# Patient Record
(demographics unavailable — no encounter records)

---

## 2025-04-15 NOTE — HISTORY OF PRESENT ILLNESS
[de-identified] : at today's visit [FreeTextEntry1] : last seen Feb 2025 for the past few weeks has been getting pain and stiffness of the knuckles, no swelling  meloxicam helps no other symptoms

## 2025-04-15 NOTE — ASSESSMENT
[FreeTextEntry1] : ------------------------------------------------------------  #Seropositive, nonerosive RA -Previously diagnosed with rheumatoid arthritis around 2 years ago, transiently on hydroxychloroquine, lost to follow-up -CCP 95, RF 79 ESR 41, CRP normal -US with trace fluid left wrist, no inflammatory signal Imaging June 2023 X-rays hands/wrists June 2023: no erosions *old distal left tibial fracture deformity , she had a fracture at age 12 X-rays of ankle/feet June 2023: no erosions  *Lost to follow up, stopped HCQ then resumed recently ([id number]) * Episode of pain/swelling at right foot as above High-Grade stress reaction and bursitis on MRI Seeing podiatrist , resolved now   Monitoring labs obtained 2/10/25 reviewed with patient today: normal limits, CRP elevated at 20 and ESR 39 Currently with pain/stiffness of hands/fingers, discussed today stepping up therapy with MTX or leflunomide risks/benefits/alternatives/side effects reviewed with patient at length, she will think about it  Continue with  mg daily, to let me know if she looses more weight to reduce the dose to 200 mg daily most recent eye exam report from April 2024, cleared to continue- needs a repeat exam  obtain labs today  obtain X-rays of hands/feet, obtain US of hands to assess for synovitis    # Chest pain, transient episodes undergoing work up with cardiology, so far negative TTE no pericardial effusion  CXR WNL    #Vitamin D insufficiency, resolved continue supplementation  #Hypothyroidism, currently not on treatment advised to follow up with endo  #APOLONIA positive, rheumatologic ROS otherwise unrevealing SSA/SSB, Sm, RNP negative TPO + DsDNA 52, normal C' negative aPLs complement and dsDNA, urine studies- WNL  RTO in 4 weeks

## 2025-04-15 NOTE — DATA REVIEWED
[FreeTextEntry1] : Labs and chart notes reviewed today with patient increase in inflammatory markers  Imaging completed reviewed with patient today TTE no pericardial effusion  CXR WNL

## 2025-04-15 NOTE — HISTORY OF PRESENT ILLNESS
[de-identified] : at today's visit [FreeTextEntry1] : last seen Feb 2025 for the past few weeks has been getting pain and stiffness of the knuckles, no swelling  meloxicam helps no other symptoms

## 2025-04-15 NOTE — ASSESSMENT
[FreeTextEntry1] : ------------------------------------------------------------  #Seropositive, nonerosive RA -Previously diagnosed with rheumatoid arthritis around 2 years ago, transiently on hydroxychloroquine, lost to follow-up -CCP 95, RF 79 ESR 41, CRP normal -US with trace fluid left wrist, no inflammatory signal Imaging June 2023 X-rays hands/wrists June 2023: no erosions *old distal left tibial fracture deformity , she had a fracture at age 12 X-rays of ankle/feet June 2023: no erosions  *Lost to follow up, stopped HCQ then resumed recently ([id number]) * Episode of pain/swelling at right foot as above High-Grade stress reaction and bursitis on MRI Seeing podiatrist , resolved now   Monitoring labs obtained 2/10/25 reviewed with patient today: normal limits, CRP elevated at 20 and ESR 39 Currently with pain/stiffness of hands/fingers, discussed today stepping up therapy with MTX or leflunomide risks/benefits/alternatives/side effects reviewed with patient at length, she will think about it  Continue with  mg daily, to let me know if she looses more weight to reduce the dose to 200 mg daily most recent eye exam report from April 2024, cleared to continue- needs a repeat exam  obtain labs today  obtain X-rays of hands/feet, obtain US of hands to assess for synovitis    # Chest pain, transient episodes undergoing work up with cardiology, so far negative TTE no pericardial effusion  CXR WNL    #Vitamin D insufficiency, resolved continue supplementation  #Hypothyroidism, currently not on treatment advised to follow up with endo  #APOLONIA positive, rheumatologic ROS otherwise unrevealing SSA/SSB, Sm, RNP negative TPO + DsDNA 52, normal C' negative aPLs complement and dsDNA, urine studies- WNL  RTO in 4 weeks

## 2025-04-15 NOTE — PHYSICAL EXAM
[General Appearance - Alert] : alert [General Appearance - In No Acute Distress] : in no acute distress [Respiration, Rhythm And Depth] : normal respiratory rhythm and effort [FreeTextEntry1] : no synovitis, tenderness to PIPs  [] : no rash [Impaired Insight] : insight and judgment were intact

## 2025-05-05 NOTE — ASSESSMENT
[FreeTextEntry1] : ------------------------------------------------------------  #Seropositive, nonerosive RA -Previously diagnosed with rheumatoid arthritis around 2 years ago, transiently on hydroxychloroquine, lost to follow-up -CCP 95, RF 79 ESR 41, CRP normal -US with trace fluid left wrist, no inflammatory signal Imaging June 2023 X-rays hands/wrists June 2023: no erosions *old distal left tibial fracture deformity , she had a fracture at age 12 X-rays of ankle/feet June 2023: no erosions  X-rays of hands/feet April 2025 no erosions   US of hands no synovitis  *Lost to follow up, stopped HCQ then resumed recently ([id number]) * Episode of pain/swelling at right foot as above High-Grade stress reaction and bursitis on MRI- Seeing podiatrist , resolved now   Last visit reported pain/stiffness of hands/fingers, discussed stepping up therapy with MTX or leflunomide risks/benefits/alternatives/side effects reviewed with patient at length, she will think about it Repeat labs with normal inflammatory markers, imaging reassuring as detailed above, reviewed options again today, she would like to hold off on stepping up therapy as of now will check HCQ level today  Continue with  mg daily  most recent eye exam report from April 2024, cleared to continue- needs a repeat exam    # Chest pain, transient episodes undergoing work up with cardiology, so far negative TTE no pericardial effusion CXR WNL   #Vitamin D insufficiency, resolved continue supplementation  #Hypothyroidism, currently not on treatment advised to follow up with endo  #APOLONIA positive, rheumatologic ROS otherwise unrevealing SSA/SSB, Sm, RNP negative TPO + DsDNA 52, normal C' negative aPLs complement and dsDNA, urine studies- WNL  RTO in 6 weeks

## 2025-05-05 NOTE — HISTORY OF PRESENT ILLNESS
[de-identified] :  At today's visit. [FreeTextEntry1] : she continues to have some pain at the knuckles, no swelling morning stiffness for 30 min not taking anything for pain

## 2025-05-05 NOTE — DATA REVIEWED
[FreeTextEntry1] : Labs, imaging and chart notes reviewed today with patient normal inflammatory markers imaging no erosions, no inflammation on US

## 2025-05-05 NOTE — PHYSICAL EXAM
[General Appearance - Alert] : alert [General Appearance - In No Acute Distress] : in no acute distress [Impaired Insight] : insight and judgment were intact [FreeTextEntry1] : tenderness to PIPs no synovitis

## 2025-07-01 NOTE — PHYSICAL EXAM
[General Appearance - Alert] : alert [General Appearance - In No Acute Distress] : in no acute distress [Respiration, Rhythm And Depth] : normal respiratory rhythm and effort [FreeTextEntry1] : synovitis at PIPs second/third right hand  [] : no rash [Impaired Insight] : insight and judgment were intact

## 2025-07-01 NOTE — HISTORY OF PRESENT ILLNESS
[de-identified] :  At today's visit. [FreeTextEntry1] :  she continues to have some pain at the knuckles, with occasional swelling morning stiffness for 30 min- 40 min takes NSAIDs with some relief

## 2025-07-01 NOTE — ASSESSMENT
[FreeTextEntry1] : ------------------------------------------------------------  #Seropositive, nonerosive RA -Previously diagnosed with rheumatoid arthritis around 2 years ago, transiently on hydroxychloroquine, lost to follow-up -CCP 95, RF 79 ESR 41, CRP normal -US with trace fluid left wrist, no inflammatory signal Imaging June 2023 X-rays hands/wrists June 2023: no erosions *old distal left tibial fracture deformity , she had a fracture at age 12 X-rays of ankle/feet June 2023: no erosions  X-rays of hands/feet April 2025 no erosions  US of hands April 2025 no synovitis  *Lost to follow up, stopped HCQ then resumed recently ([id number]) * Episode of pain/swelling at right foot as above High-Grade stress reaction and bursitis on MRI- Seeing podiatrist , resolved now   [] Recurrence of pain/stiffness of hands/fingers, discussed stepping up therapy with MTX or leflunomide risks/benefits/alternatives/side effects reviewed with patient at length, agreeable, chose leflunomide she has an IUD  [] start leflunomide at 10 mg daily for now will increase to 20 mg daily as tolerated  [] obtain labs today and infection screening labs     # Chest pain, transient episodes undergoing work up with cardiology, so far negative TTE no pericardial effusion CXR WNL   #Vitamin D insufficiency, resolved continue supplementation  #Hypothyroidism, currently not on treatment advised to follow up with endo  #APOLONIA positive, rheumatologic ROS otherwise unrevealing SSA/SSB, Sm, RNP negative TPO + DsDNA 52, normal C' negative aPLs complement and dsDNA, urine studies- WNL  RTO in 4-5 weeks.

## 2025-07-01 NOTE — DATA REVIEWED
[FreeTextEntry1] : Labs and chart notes reviewed today with patient inflammatory markers normalized